# Patient Record
Sex: FEMALE | Race: WHITE | NOT HISPANIC OR LATINO | URBAN - METROPOLITAN AREA
[De-identification: names, ages, dates, MRNs, and addresses within clinical notes are randomized per-mention and may not be internally consistent; named-entity substitution may affect disease eponyms.]

---

## 2023-12-21 ENCOUNTER — APPOINTMENT (OUTPATIENT)
Dept: ORTHOPEDIC SURGERY | Facility: CLINIC | Age: 53
End: 2023-12-21
Payer: COMMERCIAL

## 2024-01-08 ENCOUNTER — ANCILLARY PROCEDURE (OUTPATIENT)
Dept: RADIOLOGY | Facility: CLINIC | Age: 54
End: 2024-01-08
Payer: COMMERCIAL

## 2024-01-08 DIAGNOSIS — M79.671 RIGHT FOOT PAIN: ICD-10-CM

## 2024-01-08 PROCEDURE — 73630 X-RAY EXAM OF FOOT: CPT | Mod: RT

## 2024-01-08 PROCEDURE — 73630 X-RAY EXAM OF FOOT: CPT | Mod: RIGHT SIDE | Performed by: RADIOLOGY

## 2024-01-09 ENCOUNTER — OFFICE VISIT (OUTPATIENT)
Dept: ORTHOPEDIC SURGERY | Facility: CLINIC | Age: 54
End: 2024-01-09
Payer: COMMERCIAL

## 2024-01-09 ENCOUNTER — APPOINTMENT (OUTPATIENT)
Dept: RADIOLOGY | Facility: CLINIC | Age: 54
End: 2024-01-09
Payer: COMMERCIAL

## 2024-01-09 DIAGNOSIS — M79.671 RIGHT FOOT PAIN: ICD-10-CM

## 2024-01-09 DIAGNOSIS — M20.5X1 CLAWTOE, ACQUIRED, RIGHT: Primary | ICD-10-CM

## 2024-01-09 DIAGNOSIS — M20.11 HALLUX VALGUS (ACQUIRED), RIGHT FOOT: ICD-10-CM

## 2024-01-09 PROCEDURE — 99214 OFFICE O/P EST MOD 30 MIN: CPT | Performed by: ORTHOPAEDIC SURGERY

## 2024-01-09 PROCEDURE — 99204 OFFICE O/P NEW MOD 45 MIN: CPT | Performed by: ORTHOPAEDIC SURGERY

## 2024-01-09 NOTE — PROGRESS NOTES
"This 53-year-old woman complains of right foot pain and deformity which she has had \"since birth\".  Patient notes as long as she can remember she has had pain and deformity of her foot.  The patient notes her father had severe bunion deformity.    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  The patient continued to have pain and following the MRI was diagnosed with a Villagomez's neuroma in the third webspace and had it excised.  She still has some burning in the third webspace.  She notes her symptoms are markedly improved.  She tells me pathology confirmed the excision of the Villagomez's neuroma.    The patient's current symptoms are with progressive deformity of the hallux causing pain in the second toe.  Patient also notes pain throughout the second toe associated with a burning feeling in the second toe.  She notes her second toe basically hurts \"all the time\".  Patient also complains of pain in the first metatarsal phalangeal joint associated with crepitus.    Review of systems:  A complete review of the patient's past medical history and review of 30 systems and all medications and allergies was performed. Please see adult medical record for details.    A Family history for genetic or familial inheritance issues and Social history including smoking history, alcohol consumption and exercise activities was also reviewed and significant findings noted in the patients history of present illness.    Physical Exam:  The patient is well-nourished and well-developed and in no acute distress. The patient displayed normal mood and affect. The patient's pupils were equal, round sclerae are white. Patient's respirations appear to be regular and unlabored.    The right foot was examined.  Patient's gait and stance revealed mild pes planus and pronation.  There did " not appear to be any skin abnormalities or lymphangitis.  There was no significant abnormal swelling or lymphangitis or erythema.  There was a hallux valgus deformity with the great toe impinging on the second toe.  Skin over the medial eminence was thickened, erythematous and callused.  There was mild tenderness over the medial eminence.  The range of motion of the first metatarsal phalangeal joint was somewhat restricted to 20° of dorsiflexion 0°, plantar flexion.  There was pain and crepitus with range of motion of the joint at the metatarsal phalangeal joint.    The second toe revealed a fixed claw toe deformity.  There was a tender callosity over the proximal interphalangeal joint.  There was medial deviation of the digit as well as the pain lesser toes.    Range of motion of the ankle, subtalar, midtarsal joints were normal with a positive Silfverskiold test. the neurovascular examination of the foot and ankle was intact.The neurological exam including motor and sensory exam was performed. The vascular examination including palpation of pulses and capillary refill of the foot was performed and determined to be intact.    Imaging:  I personally reviewed and measured the radiographs including AP and lateral views of the extremity and they revealed hallux valgus deformity measuring 48 degrees with degenerative changes seen in the first metatarsal phalangeal joint and the intermetatarsal angle of 12.4 degrees.  Clawing of the second toe.    Impression & Plan:   It is my impression this patient has symptomatic hallux valgus deformity with degenerative arthritis of the first metatarsal phalangeal joint.  She also has a symptomatic second toe claw toe deformity.    I would recommend right foot fusion first metatarsal phalangeal joint and correction second toe claw toe deformity with a PIP joint fusion and release the contracted tissue at the metatarsal phalangeal joint.  Special equipment needed are to 2.8 mm  threaded double pointed Steinmann pins.    The risks of first metatarsophalangeal joint fusion and second toe claw toe deformity correction, options, and procedure were explained to the patient in detail. All of their questions were answered. The patient understands that we cannot make them a new or normal foot and understands the risk of recurrence, the risk of nonunion, the risk of residual joint stiffness, as well as the risks of infection and healing complications.  It is unlikely that the surgery will relieve her burning symptoms in the web spaces of her toes from the Villagomez's neuroma surgery.  The procedure can be performed as an out patient.  The need for second procedure to remove the intramedullary threaded Steinmann pins approximately 12 weeks after the initial fusion was discussed.  The patient should avoid travel for the entire 12 weeks of healing.          Note dictated with [employer] software.  Completed without full type editing and sent to avoid delay.

## 2024-03-04 ENCOUNTER — TELEPHONE (OUTPATIENT)
Dept: ORTHOPEDIC SURGERY | Facility: HOSPITAL | Age: 54
End: 2024-03-04
Payer: COMMERCIAL

## 2024-03-12 ENCOUNTER — OFFICE VISIT (OUTPATIENT)
Dept: ORTHOPEDIC SURGERY | Facility: CLINIC | Age: 54
End: 2024-03-12
Payer: COMMERCIAL

## 2024-03-12 DIAGNOSIS — M20.11 HALLUX VALGUS (ACQUIRED), RIGHT FOOT: Primary | ICD-10-CM

## 2024-03-12 DIAGNOSIS — M20.5X1 CLAWTOE, ACQUIRED, RIGHT: ICD-10-CM

## 2024-03-12 PROCEDURE — 99214 OFFICE O/P EST MOD 30 MIN: CPT | Performed by: ORTHOPAEDIC SURGERY

## 2024-03-12 RX ORDER — CELECOXIB 200 MG/1
200 CAPSULE ORAL DAILY
Qty: 30 CAPSULE | Refills: 0 | Status: SHIPPED | OUTPATIENT
Start: 2024-03-12 | End: 2024-04-11

## 2024-03-12 NOTE — PROGRESS NOTES
This 53-year-old woman who works in [employer] business returns today to discuss her surgery scheduled for June 18 consisting of a fusion of the right first metatarsal phalangeal joint and correction second toe claw toe deformity.    Complicating the patient's situation is treatment she had in [place] for Villagomez's neuroma with a ligament release in the second webspace followed by a second surgery for excision of a Villagomez's neuroma in the third webspace.  The patient is continuing to have intermittent shooting pain in the lateral aspect of her foot.  Sometimes she gets tingling and numbness in her lessor toes.    Review of systems:  A complete review of the patient's past medical history and review of 30 systems and all medications and allergies was performed. Please see adult medical record for details.    A Family history for genetic or familial inheritance issues and Social history including smoking history, alcohol consumption and exercise activities was also reviewed and significant findings noted in the patients history of present illness.    Physical Exam:  The patient is well-nourished and well-developed and in no acute distress. The patient displayed normal mood and affect. The patient's pupils were equal, round sclerae are white. Patient's respirations appear to be regular and unlabored.    The right foot was examined.  There did not appear to be any skin abnormalities or lymphangitis.  There was no significant abnormal swelling or lymphangitis or erythema.  There was a well-healed incision at both the dorsum of the second and third webspaces.  Negative click test in both webspaces.    There was a severe hallux valgus deformity with the great toe impinging on the second toe.  Skin over the medial eminence was thickened and callused.  There was mild tenderness over the medial eminence.  The range of motion of the first metatarsal phalangeal joint was restricted to 20° of dorsiflexion 0°, plantar flexion.   Was discomfort with range of motion of the first metatarsal phalangeal joint.    There was full motion of the IP joint of the great toe.    The second toe revealed a fixed claw toe deformity.    Range of motion of the ankle, subtalar, midtarsal joints were normal with a positive Silfverskiold test.    The neurovascular examination of the foot and ankle was intact.The neurological exam including motor and sensory exam was performed. The vascular examination including palpation of pulses and capillary refill of the foot was performed and determined to be intact.      Imaging:  I personally reviewed and measured the radiographs including AP and lateral views of the extremity and they revealed [hallux valgus deformity measuring 48 degrees with degenerative changes seen in the first metatarsal phalangeal joint and an intermetatarsal angle of 12.4 degrees.  Claw toe deformity of the second toe.    Impression & Plan:   It is my impression this patient has severe hallux valgus and a second toe claw toe deformity which is fixed.  She has degenerative arthritis of the first metatarsal phalangeal joint.  The great toe is markedly impinging on the lesser toes.    I have recommended a right first metatarsal phalangeal joint fusion and a correction of the second claw toe deformity.    Once again I explained to the patient the risks option and procedure of the first metatarsal phalangeal joint fusion and correction of the second claw toe deformity.  Once again I explained to the patient we cannot make her new or normal foot.  Surgery will take 12 weeks to heal and at that time she will need percutaneous pin removal in the operating room under twilight sleep anesthesia.  She will only be able to walk on her heel during the 12 weeks of healing.  She should not travel by air during that period because of the risk of blood clots.    Patient appears to be having chronic neurologic type pain from her Villagomez's neuroma surgeries.   "Explained to the patient that it is unlikely but possible that providing her with a better aligned foot will have some effect on the neurogenic pain she is having from the Villagomez's neuroma surgeries.  If she has continued pain in those areas I would recommend consultation with the pain clinic.    The patient tells me she is can be very busy between now and June doing a lot of walking at meetings.  She is having a lot of pain in her foot with weightbearing activities.  She has tried ibuprofen as well as Naprosyn and they \"tear up her stomach\".  I suggested she might want to try Celebrex because of her stomach problems and difficulty taking the other medications.  The patient would like a prescription for the Celebrex to take while she is at her meetings.    Prescription for Celebrex 200 mg tablets 1 p.o. daily with warnings of possible side effects was provided to the patient.    I would be delighted to see the patient back the future should  they  have further problems.    Note dictated with [employer] software.  Completed without full type editing and sent to avoid delay.        "

## 2024-04-10 ENCOUNTER — HOSPITAL ENCOUNTER (OUTPATIENT)
Facility: HOSPITAL | Age: 54
Setting detail: OUTPATIENT SURGERY
End: 2024-04-10
Attending: ORTHOPAEDIC SURGERY | Admitting: ORTHOPAEDIC SURGERY
Payer: COMMERCIAL

## 2024-04-10 PROBLEM — M20.5X1 OTHER DEFORMITIES OF TOE(S) (ACQUIRED), RIGHT FOOT: Status: ACTIVE | Noted: 2024-04-10

## 2024-05-09 ENCOUNTER — OFFICE VISIT (OUTPATIENT)
Dept: ORTHOPEDIC SURGERY | Facility: CLINIC | Age: 54
End: 2024-05-09
Payer: COMMERCIAL

## 2024-05-09 VITALS — HEIGHT: 63 IN | BODY MASS INDEX: 25.16 KG/M2 | WEIGHT: 142 LBS

## 2024-05-09 DIAGNOSIS — M20.41 HAMMERTOE OF RIGHT FOOT: ICD-10-CM

## 2024-05-09 DIAGNOSIS — M20.11 HALLUX VALGUS (ACQUIRED), RIGHT FOOT: Primary | ICD-10-CM

## 2024-05-09 PROCEDURE — 99204 OFFICE O/P NEW MOD 45 MIN: CPT | Performed by: ORTHOPAEDIC SURGERY

## 2024-05-09 PROCEDURE — 1036F TOBACCO NON-USER: CPT | Performed by: ORTHOPAEDIC SURGERY

## 2024-05-09 RX ORDER — ESTROGENS, CONJUGATED 0.45 MG/1
0.45 TABLET, FILM COATED ORAL DAILY
COMMUNITY

## 2024-05-09 RX ORDER — CLONAZEPAM 0.25 MG/1
TABLET, ORALLY DISINTEGRATING ORAL
COMMUNITY

## 2024-05-09 RX ORDER — ACETAMINOPHEN 325 MG/1
TABLET ORAL
COMMUNITY
Start: 2022-06-06

## 2024-05-09 RX ORDER — LORAZEPAM 0.5 MG/1
0.5 TABLET ORAL DAILY PRN
COMMUNITY

## 2024-05-09 RX ORDER — ZOLPIDEM TARTRATE 5 MG/1
5 TABLET ORAL NIGHTLY PRN
COMMUNITY

## 2024-05-09 RX ORDER — SODIUM CHLORIDE, SODIUM LACTATE, POTASSIUM CHLORIDE, CALCIUM CHLORIDE 600; 310; 30; 20 MG/100ML; MG/100ML; MG/100ML; MG/100ML
100 INJECTION, SOLUTION INTRAVENOUS CONTINUOUS
OUTPATIENT
Start: 2024-05-09

## 2024-05-09 RX ORDER — CHOLECALCIFEROL (VITAMIN D3) 25 MCG
TABLET ORAL
COMMUNITY

## 2024-05-09 RX ORDER — FLUTICASONE PROPIONATE 50 MCG
SPRAY, SUSPENSION (ML) NASAL
COMMUNITY
Start: 2023-11-30

## 2024-05-09 RX ORDER — PROGESTERONE 100 MG/1
100 CAPSULE ORAL DAILY
COMMUNITY

## 2024-05-09 RX ORDER — SERTRALINE HYDROCHLORIDE 50 MG/1
50 TABLET, FILM COATED ORAL DAILY
COMMUNITY

## 2024-05-09 RX ORDER — CALC/MAG/B COMPLEX/D3/HERB 61
TABLET ORAL
COMMUNITY
Start: 2018-09-18

## 2024-05-09 RX ORDER — BUPROPION HYDROCHLORIDE 150 MG/1
150 TABLET ORAL
COMMUNITY

## 2024-05-09 RX ORDER — CEFAZOLIN SODIUM 2 G/100ML
2 INJECTION, SOLUTION INTRAVENOUS ONCE
OUTPATIENT
Start: 2024-05-09 | End: 2024-05-09

## 2024-05-09 NOTE — PROGRESS NOTES
53-year-old female here for right foot pain.  Long history of issues with her right foot.  H neuroma excision in [place] a few years ago.  Initially someone did a ligament release in the second webspace and then ultimately a third webspace neuroma excision.  Her preoperative nerve pain was gone though she does have some pain in the toes.  Her biggest complaint of pain is around her second toe where her big toe rubs in place.  Does not wear high-heeled shoes anymore.  Has had a longstanding bunion deformity.  Has lesser toe deformities of her second third and fourth toe.  No diabetes.  Non-smoker.    On exam:  WD/WN thin female  A+O X3  NAD  No lymphedema  Inspection of both feet and ankles show symmetric arches.   Able to STR bilaterally.   Severe flexible hallux valgus with impinging second toe.  Tender second PIP joint.  Fixed third mallet toe and fourth curly toe DIP joint.  No pain with lesser MTP anterior drawer.  5/5 strength in all 4 planes.   Sensation intact to LT.   Good pulses.   Stable anterior drawer.  No peroneal subluxation.    (-) Jadyn.     I personally reviewed the following radiographic exams: Weightbearing x-rays right foot from [facility] shows degenerative hallux valgus.  Arthritic sesamoids on the sesamoid view.    Assessment: Degenerative hallux valgus right foot.  Impinging right second hammertoe.  Right third mallet toe.  Right fourth curly toe.    Plan: Discussed nonoperative and operative options in detail.   Risk and benefits discussed in detail. All questions answered today.  Recovery timeline and expectations discussed in detail.  The bunion is not that symptomatic, it is causing her most significant pain in her second toe.  We discussed the surgical options for the great toe including first MTP fusion versus Rutherford osteotomy.  This would allow us to fix her second toe.  She is leaning towards a first MTP fusion.  We discussed correction of her second toe, third toe and  fourth toe with PIP or DIP arthroplasty with K wire.  We discussed postop recovery in detail.  Right first MTP fusion [id number], second PIP/third and fourth DIP fusions [id number].  Arthrex first MTP set, K wires.  C-arm,.. General plus regional. 45 minutes  Crutches or Walker/ Consider knee scooter   postop shoe

## 2024-05-17 PROBLEM — M20.41 HAMMERTOE OF RIGHT FOOT: Status: ACTIVE | Noted: 2024-05-09
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
## 2024-06-17 ENCOUNTER — CLINICAL SUPPORT (OUTPATIENT)
Dept: AUDIOLOGY | Facility: CLINIC | Age: 54
End: 2024-06-17
Payer: COMMERCIAL

## 2024-06-17 DIAGNOSIS — R42 DIZZINESS: Primary | ICD-10-CM

## 2024-06-17 PROCEDURE — 92557 COMPREHENSIVE HEARING TEST: CPT | Performed by: AUDIOLOGIST

## 2024-06-17 PROCEDURE — 92550 TYMPANOMETRY & REFLEX THRESH: CPT | Performed by: AUDIOLOGIST

## 2024-06-17 NOTE — PROGRESS NOTES
"  ADULT AUDIOMETRIC EVALUATION    Name:  Gayla Jarvis  :  1970  Age:  54 y.o.  Date of Evaluation:  2024    HISTORY:  Reason for visit: Ms. Jarvis is seen today for an evaluation of hearing.     Patient reports no concerns for hearing loss at this time. She is being seen for an ENG on 2024. Today's appointment serves as her audiologic evaluation in conjunction with the vestibular evaluation. She reports dizziness beginning in April of this year as she woke up with spinning vertigo. Reportedly, she was seen by ENT who tested her for BPPV which was mildly positive. Patient was given instruction to do the Epley maneuver at home, which she reported helped ease the dizziness but did not completely get rid of it. She notices the dizziness most after sitting at her computer. Of note, she reported testing positive for Influenza A three weeks prior to the onset of dizziness. She also believes the dizziness may be neck-related. Patient reported occasional tinnitus which began many years ago. Her tinnitus and hearing has not worsened since the dizziness began in April.     Denies: otorrhea, noise exposure, and aural fullness    EVALUATION:    See Audiogram and Immittance results under \"Media\".    RESULTS:     Otoscopic Evaluation:     RIGHT  Clear canal with tympanic membrane visualized    LEFT  Clear canal with tympanic membrane visualized    Immittance:   Immittance Measures: 226 Hz          Right Ear: Type A: Normal middle ear function         Left Ear:  Type A: Normal middle ear function    Reflexes and Reflex Decay:    Ipsilateral Reflexes (500-4000 Hz):          Probe/Stimulus Right Ear: present       Probe/Stimulus Left Ear: present           Audiometry:  Test Technique: Pure Tone Audiometry under TDH headphones.    Reliability: Excellent     Pure Tone Audiometry:    Right: Normal hearing sensitivity from 125-8000 Hz   Left: Normal hearing sensitivity from 125-8000 Hz    Speech Audiometry (via " recorded, 25-words unless noted; M=masked):       Right Ear: Speech Reception Threshold (SRT) was obtained at 10 dBHL  Word Recognition Scores were Excellent (100%) in quiet when words were presented at 50 dBHL (20M), using the NU-6 2A word list.  Left Ear: Speech Reception Threshold (SRT) was obtained at 10 dBHL  Word Recognition Scores were Excellent (100%) in quiet when words were presented at 50 dBHL (20M), using the NU-6 2A word list.      IMPRESSIONS:  Today's test results suggest normal middle ear function and normal hearing sensitivity from 125-8000 Hz bilaterally.       PATIENT EDUCATION:   Gayla Jarvis was counseled with regard to the findings.       PLAN:  Follow up with ENT and audiology as directed/scheduled.  Retest hearing in conjunction with medical management and sooner if any concerns arise.    Brandon [facility]  Audiology Graduate Clinician    Brandon Caceres, CCC-A  Clinical Audiologist    Time: 13:00-13:30    This note was created using speech recognition transcription software. Despite proofreading, several typographical errors might be present that might affect the meaning of the content. Please call with any questions.     Degree of   Hearing Sensitivity dB Range   Within Normal Limits (WNL) 0 - 20   Slight 25   Mild 26 - 40   Moderate 41 - 55   Moderately-Severe 56 - 70   Severe 71 - 90   Profound 91 +     KEY  TM Tympanic Membrane   WNL Within Normal Limits   HA Hearing Aid   SNHL Sensorineural Hearing Loss   CHL Conductive Hearing Loss   NIHL Noise-Induced Hearing Loss   ECV Ear Canal Volume

## 2024-06-19 ENCOUNTER — CLINICAL SUPPORT (OUTPATIENT)
Dept: AUDIOLOGY | Facility: CLINIC | Age: 54
End: 2024-06-19
Payer: COMMERCIAL

## 2024-06-19 DIAGNOSIS — R42 DIZZINESS AND GIDDINESS: Primary | ICD-10-CM

## 2024-06-19 PROCEDURE — 92519 VEMP TST I&R CERVICAL&OCULAR: CPT

## 2024-06-19 PROCEDURE — 92540 BASIC VESTIBULAR EVALUATION: CPT

## 2024-06-19 PROCEDURE — 92538 CALORIC VSTBLR TEST W/REC: CPT

## 2024-06-19 PROCEDURE — 92700 UNLISTED ORL SERVICE/PX: CPT

## 2024-06-19 NOTE — PATIENT INSTRUCTIONS
BALANCE FUNCTION TEST (BFT)  AFTER VISIT SUMMARY      TESTING SUMMARY     The purpose of today's testing was to evaluate for any vestibular system (inner ear) involvement to account for your symptoms of dizziness/imbalance. Deep inside each of your ears, there are 5 balance organs which contribute to your ability to maintain balance and reduce dizziness. Our vestibular system involves 3 semicircular canals (“spinning detectors”) and 2 otolith organs (“gravity sensors”).    IMPRESSIONS     Based on today's evaluation, your vestibular system appears to be normal and functioning as expected. Overall, there is a low likelihood of the inner ear as a source for your symptoms.     RECOMMENDATIONS     Continue medical follow up with Dr. Gross.   Consider further evaluation into non-vestibular conditions to account for symptoms.  Consider re-evaluation as medically indicated.  Maintain a healthy lifestyle to help body function overall.    Testing and interpretation of results completed by Brandon Pablo CCC-A CCARELI. It was my pleasure to evaluate this patient.       Brandon PabloA CCARELI  Senior Clinical Vestibular Audiologist

## 2024-06-24 ENCOUNTER — APPOINTMENT (OUTPATIENT)
Dept: ORTHOPEDIC SURGERY | Facility: CLINIC | Age: 54
End: 2024-06-24
Payer: COMMERCIAL

## 2024-07-03 DIAGNOSIS — M20.41 HAMMERTOE OF RIGHT FOOT: ICD-10-CM

## 2024-07-03 DIAGNOSIS — M20.11 HALLUX VALGUS (ACQUIRED), RIGHT FOOT: ICD-10-CM

## 2024-07-03 DIAGNOSIS — M20.5X1 CLAWTOE, ACQUIRED, RIGHT: ICD-10-CM

## 2024-07-08 ENCOUNTER — TELEPHONE (OUTPATIENT)
Dept: PREADMISSION TESTING | Facility: HOSPITAL | Age: 54
End: 2024-07-08
Payer: COMMERCIAL

## 2024-07-10 ENCOUNTER — LAB (OUTPATIENT)
Dept: LAB | Facility: LAB | Age: 54
End: 2024-07-10
Payer: COMMERCIAL

## 2024-07-10 ENCOUNTER — PRE-ADMISSION TESTING (OUTPATIENT)
Dept: PREADMISSION TESTING | Facility: HOSPITAL | Age: 54
End: 2024-07-10
Payer: COMMERCIAL

## 2024-07-10 ENCOUNTER — DOCUMENTATION (OUTPATIENT)
Dept: PREADMISSION TESTING | Facility: HOSPITAL | Age: 54
End: 2024-07-10

## 2024-07-10 VITALS
DIASTOLIC BLOOD PRESSURE: 48 MMHG | WEIGHT: 152.12 LBS | BODY MASS INDEX: 26.95 KG/M2 | RESPIRATION RATE: 18 BRPM | OXYGEN SATURATION: 99 % | SYSTOLIC BLOOD PRESSURE: 110 MMHG | HEIGHT: 63 IN | TEMPERATURE: 98.1 F | HEART RATE: 72 BPM

## 2024-07-10 DIAGNOSIS — Z01.818 PREOP EXAMINATION: Primary | ICD-10-CM

## 2024-07-10 DIAGNOSIS — Z01.818 PREOP EXAMINATION: ICD-10-CM

## 2024-07-10 LAB
BASOPHILS # BLD AUTO: 0.09 X10*3/UL (ref 0–0.1)
BASOPHILS NFR BLD AUTO: 1.9 %
EOSINOPHIL # BLD AUTO: 0.31 X10*3/UL (ref 0–0.7)
EOSINOPHIL NFR BLD AUTO: 6.7 %
ERYTHROCYTE [DISTWIDTH] IN BLOOD BY AUTOMATED COUNT: 11.8 % (ref 11.5–14.5)
HCT VFR BLD AUTO: 44.5 % (ref 36–46)
HGB BLD-MCNC: 14.8 G/DL (ref 12–16)
IMM GRANULOCYTES # BLD AUTO: 0.02 X10*3/UL (ref 0–0.7)
IMM GRANULOCYTES NFR BLD AUTO: 0.4 % (ref 0–0.9)
LYMPHOCYTES # BLD AUTO: 1.4 X10*3/UL (ref 1.2–4.8)
LYMPHOCYTES NFR BLD AUTO: 30.2 %
MCH RBC QN AUTO: 31 PG (ref 26–34)
MCHC RBC AUTO-ENTMCNC: 33.3 G/DL (ref 32–36)
MCV RBC AUTO: 93 FL (ref 80–100)
MONOCYTES # BLD AUTO: 0.33 X10*3/UL (ref 0.1–1)
MONOCYTES NFR BLD AUTO: 7.1 %
NEUTROPHILS # BLD AUTO: 2.49 X10*3/UL (ref 1.2–7.7)
NEUTROPHILS NFR BLD AUTO: 53.7 %
NRBC BLD-RTO: 0 /100 WBCS (ref 0–0)
PLATELET # BLD AUTO: 209 X10*3/UL (ref 150–450)
RBC # BLD AUTO: 4.78 X10*6/UL (ref 4–5.2)
WBC # BLD AUTO: 4.6 X10*3/UL (ref 4.4–11.3)

## 2024-07-10 PROCEDURE — 85025 COMPLETE CBC W/AUTO DIFF WBC: CPT

## 2024-07-10 PROCEDURE — 99204 OFFICE O/P NEW MOD 45 MIN: CPT | Performed by: NURSE PRACTITIONER

## 2024-07-10 PROCEDURE — 87081 CULTURE SCREEN ONLY: CPT | Mod: AHULAB | Performed by: NURSE PRACTITIONER

## 2024-07-10 PROCEDURE — 36415 COLL VENOUS BLD VENIPUNCTURE: CPT

## 2024-07-10 RX ORDER — CHLORHEXIDINE GLUCONATE ORAL RINSE 1.2 MG/ML
15 SOLUTION DENTAL 2 TIMES DAILY
Qty: 473 ML | Refills: 0 | Status: SHIPPED | OUTPATIENT
Start: 2024-07-10

## 2024-07-10 ASSESSMENT — ENCOUNTER SYMPTOMS
ARTHRALGIAS: 1
ENDOCRINE NEGATIVE: 1
NEUROLOGICAL NEGATIVE: 1
RESPIRATORY NEGATIVE: 1
CONSTITUTIONAL NEGATIVE: 1
GASTROINTESTINAL NEGATIVE: 1

## 2024-07-10 NOTE — PREPROCEDURE INSTRUCTIONS
Medication List            Accurate as of July 10, 2024  7:59 AM. Always use your most recent med list.                acetaminophen 325 mg tablet  Commonly known as: Tylenol  Medication Adjustments for Surgery: Take morning of surgery with sip of water, no other fluids     buPROPion  mg 24 hr tablet  Commonly known as: Wellbutrin XL  Medication Adjustments for Surgery: Take morning of surgery with sip of water, no other fluids     chlorhexidine 0.12 % solution  Commonly known as: Peridex  Use 15 mL in the mouth or throat 2 times a day. Use night before surgery and morning of surgery Swish and spit  Medication Adjustments for Surgery: Take morning of surgery with sip of water, no other fluids     cholecalciferol 25 MCG (1000 UT) tablet  Commonly known as: Vitamin D-3  Medication Adjustments for Surgery: Continue until night before surgery     clonazePAM 0.25 mg disintegrating tablet  Commonly known as: KlonoPIN  Medication Adjustments for Surgery: Take morning of surgery with sip of water, no other fluids     fluticasone 50 mcg/actuation nasal spray  Commonly known as: Flonase  Medication Adjustments for Surgery: Take morning of surgery with sip of water, no other fluids     LORazepam 0.5 mg tablet  Commonly known as: Ativan  Medication Adjustments for Surgery: Take morning of surgery with sip of water, no other fluids     Premarin 0.45 mg tablet  Generic drug: estrogens (conjugated)  Medication Adjustments for Surgery: Continue until night before surgery     Prevacid 15 mg DR capsule  Generic drug: lansoprazole  Medication Adjustments for Surgery: Take morning of surgery with sip of water, no other fluids     progesterone 100 mg capsule  Commonly known as: Prometrium  Medication Adjustments for Surgery: Take morning of surgery with sip of water, no other fluids     sertraline 50 mg tablet  Commonly known as: Zoloft  Medication Adjustments for Surgery: Take morning of surgery with sip of water, no other  fluids     zolpidem 5 mg tablet  Commonly known as: Ambien  Medication Adjustments for Surgery: Continue until night before surgery     ZyrTEC 10 mg capsule  Generic drug: cetirizine  Medication Adjustments for Surgery: Continue until night before surgery                          **Concerning above medication instructions, if medication is normally taken at night, continue normal schedule.**  **DO NOT TAKE NIGHT PRIOR AND MORNING OF SURGERY**    CONTACT SURGEON'S OFFICE IF YOU DEVELOP:  * Fever = 100.4 F   * New respiratory symptoms (e.g. cough, shortness of breath, respiratory distress, sore throat)  * Recent loss of taste or smell  *Flu like symptoms such as headache, fatigue or gastrointestinal symptoms  * You develop any open sores, shingles, burning or painful urination   AND/OR:  * You no longer wish to have the surgery.  * Any other personal circumstances change that may lead to the need to cancel or defer this surgery.  *You were admitted to any hospital within one week of your planned procedure.    SMOKING:  *Quitting smoking can make a huge difference to your health and recovery from surgery.    *If you need help with quitting, call 1-622-QUIT-NOW.    THE DAY BEFORE SURGERY:  *Do not eat any food after midnight the night before surgery.   *You are permitted to drink clear liquids (i.e. water, black coffee (no milk or cream), tea, apple juice and electrolyte drinks (gatorade)) up to 13.5 ounces, up to 2 hours before your arrival time.  *You may chew gum until 2 hours before your surgery    SURGICAL TIME  *You will be contacted between 2 p.m. and 6 p.m. the business day before your surgery with your arrival time.  *If you haven't received a call by 6pm, call [phone].  *Scheduled surgery times may change and you will be notified if this occurs-check your personal voicemail for any updates.    ON THE MORNING OF SURGERY:  *Wear comfortable, loose fitting clothing.   *Do not use moisturizers, creams,  lotions or perfume.  *All jewelry and valuables should be left at home.  *Prosthetic devices such as contact lenses, hearing aids, dentures, eyelash extensions, hairpins and body piercing must be removed before surgery.    BRING WITH YOU:  *Photo ID and insurance card  *Current list of medicines and allergies  *Pacemaker/Defibrillator/Heart stent cards  *CPAP machine and mask  *Slings/splints/crutches  *Copy of your complete Advanced Directive/DHPOA-if applicable  *Neurostimulator implant remote    PARKING AND ARRIVAL:  *Check in at the Main Entrance desk and let them know you are here for surgery.  *You will be directed to the 2nd floor surgical waiting area.    AFTER OUTPATIENT SURGERY:  *A responsible adult MUST accompany you at the time of discharge and stay with you for 24 hours after your surgery.  *You may NOT drive yourself home after surgery.  *You may use a taxi or ride sharing service ([employer], [employer]) to return home ONLY if you are accompanied by a friend or family member.  *Instructions for resuming your medications will be provided by your surgeon.

## 2024-07-10 NOTE — CPM/PAT NURSE NOTE
CPM/PAT Nurse Note      Name: Gayla Jarvis (Gayla Jarvis)  /Age: 1970/54 y.o.       Past Medical History:   Diagnosis Date    Allergic rhinitis     Anxiety     Bunion     Depression     Diverticulosis     GERD (gastroesophageal reflux disease)     Hammer toe 2000    Neuroma of foot 1/15/22       Past Surgical History:   Procedure Laterality Date    COLONOSCOPY      DILATION AND CURETTAGE OF UTERUS      FOOT NEUROMA SURGERY Right     x2    WISDOM TOOTH EXTRACTION         Patient  reports that she is not currently sexually active.    Family History   Problem Relation Name Age of Onset    Cancer Mother Pooja Jarvis     Hyperlipidemia Mother Pooja Jarvis     Hypertension Mother Pooja Jarvis     Transient ischemic attack Mother Pooja Jarvis     Heart disease Mother Pooja Jarvis     Heart disease Father      Cirrhosis Father      Hypertension Father      No Known Problems Sister         Allergies   Allergen Reactions    Ofloxacin Itching, Rash, Swelling and Other     felt like throat closing    Sulfamethoxazole-Trimethoprim Other, Itching, Rash, Swelling and Unknown     felt like throat closing    Metronidazole Unknown       Prior to Admission medications    Medication Sig Start Date End Date Taking? Authorizing Provider   acetaminophen (Tylenol) 325 mg tablet TAKE 2 TABLETS BY MOUTH EVERY 6 HOURS AS NEEDED FOR MILD PAIN. 22   Historical Provider, MD   buPROPion XL (Wellbutrin XL) 150 mg 24 hr tablet Take 1 tablet (150 mg) by mouth once daily in the morning. Take before meals.    Historical Provider, MD   cetirizine (ZyrTEC) 10 mg capsule     Historical Provider, MD   chlorhexidine (Peridex) 0.12 % solution Use 15 mL in the mouth or throat 2 times a day. Use night before surgery and morning of surgery Swish and spit 7/10/24   Joyce Gilbert, APRN-CNP   cholecalciferol (Vitamin D-3) 25 MCG (1000 UT) tablet     Historical Provider, MD   clonazePAM (KlonoPIN) 0.25 mg disintegrating tablet PLACE 1 TABLET  (0.25 MG) ON THE TONGUE AND ALLOW TO DISSOLVE 2 TIMES PER DAY    Historical Provider, MD   fluticasone (Flonase) 50 mcg/actuation nasal spray 2 sprays, Nasal, DAILY WITH BREAKFAST, # 16 g, Refill(s) 11, Date: 11/30/23 9:36:00 AM EST, Pharmacy: [facility]/pharmacy #[id number], 2 sprays Nasal DAILY WITH BREAKFAST, Facial pain 11/30/23   Historical Provider, MD   lansoprazole (Prevacid) 15 mg DR capsule Take by mouth. 9/18/18   Historical Provider, MD   LORazepam (Ativan) 0.5 mg tablet Take 1 tablet (0.5 mg) by mouth once daily as needed.    Historical Provider, MD   Premarin 0.45 mg tablet Take 1 tablet (0.45 mg) by mouth once daily.    Historical Provider, MD   progesterone (Prometrium) 100 mg capsule Take 1 capsule (100 mg) by mouth once daily.    Historical Provider, MD   sertraline (Zoloft) 50 mg tablet Take 1 tablet (50 mg) by mouth once daily.    Historical Provider, MD   zolpidem (Ambien) 5 mg tablet Take 1 tablet (5 mg) by mouth as needed at bedtime.    Historical Provider, MD        PAT ROS     DASI Risk Score    No data to display       Caprini DVT Assessment    No data to display       Modified Frailty Index    No data to display       CHADS2 Stroke Risk  Current as of 24 minutes ago        N/A 3 to 100%: High Risk   2 to < 3%: Medium Risk   0 to < 2%: Low Risk     Last Change: N/A          This score determines the patient's risk of having a stroke if the patient has atrial fibrillation.        This score is not applicable to this patient. Components are not calculated.          Revised Cardiac Risk Index    No data to display       Apfel Simplified Score    No data to display       Risk Analysis Index Results This Encounter    No data found in the last 1 encounters.         Nurse Plan of Action: After Visit Summary (AVS) reviewed and patient verbalized good understanding of medications and NPO instructions.

## 2024-07-10 NOTE — H&P (VIEW-ONLY)
[facility] Evaluation       Name: Gayla Jarvis (Gayla Jarvis)  /Age: 1970/54 y.o.     In-Person           HPI        Date of Consult: 7/10/24    Referring Provider: Dr. Barrow     Surgery, Date, and Length: Right Foot Arthrodesis,     Gayla Jarvis is a 54 year-old female who presents to the [facility] for perioperative risk assessment prior to surgery.    Patient presents with a primary diagnosis of right foot pain. Long history of issues with her right foot. H neuroma excision in [place] a few years ago. Initially someone did a ligament release in the second webspace and then ultimately a third webspace neuroma excision. Her preoperative nerve pain was gone though she does have some pain in the toes. Her biggest complaint of pain is around her second toe where her big toe rubs in place. Does not wear high-heeled shoes anymore. Has had a longstanding bunion deformity. Has lesser toe deformities of her second third and fourth toe. No diabetes. Non-smoker.     This note was created in part upon personal review of patient's medical records.      Patient is scheduled to have Right Foot Arthrodesis      Pt denies any past history of anesthetic complications such as PONV, awareness, prolonged sedation, dental damage, aspiration, cardiac arrest, difficult intubation, difficult I.V. access or unexpected hospital admissions.  NO malignant hyperthermia and or pseudocholinesterase deficiency.  No history of blood transfusions     STOP BANG 1    The patient is not a [employer] and will accept blood and blood products if medically indicated.   Type and screen not sent.     Past Medical History:   Diagnosis Date    Allergic rhinitis     Anxiety     Bunion     Depression     Diverticulosis     GERD (gastroesophageal reflux disease)     Hammer toe 2000    Neuroma of foot 1/15/22       Past Surgical History:   Procedure Laterality Date    COLONOSCOPY      DILATION AND CURETTAGE OF UTERUS      FOOT NEUROMA  SURGERY Right     x2    WISDOM TOOTH EXTRACTION          Social History     Tobacco Use    Smoking status: Never    Smokeless tobacco: Never   Substance Use Topics    Alcohol use: Never    Drug use: Never      Family History   Problem Relation Name Age of Onset    Cancer Mother Pooja Jarvis     Hyperlipidemia Mother Pooja Jarvis     Hypertension Mother Pooja Jarvis     Transient ischemic attack Mother Pooja Jarvis     Heart disease Mother Pooja Jarvis     Heart disease Father      Cirrhosis Father      Hypertension Father      No Known Problems Sister             Allergies   Allergen Reactions    Ofloxacin Itching, Rash, Swelling and Other     felt like throat closing    Sulfamethoxazole-Trimethoprim Other, Itching, Rash, Swelling and Unknown     felt like throat closing    Metronidazole Unknown       Current Outpatient Medications:     acetaminophen (Tylenol) 325 mg tablet, TAKE 2 TABLETS BY MOUTH EVERY 6 HOURS AS NEEDED FOR MILD PAIN., Disp: , Rfl:     buPROPion XL (Wellbutrin XL) 150 mg 24 hr tablet, Take 1 tablet (150 mg) by mouth once daily in the morning. Take before meals., Disp: , Rfl:     cetirizine (ZyrTEC) 10 mg capsule, , Disp: , Rfl:     cholecalciferol (Vitamin D-3) 25 MCG (1000 UT) tablet, , Disp: , Rfl:     clonazePAM (KlonoPIN) 0.25 mg disintegrating tablet, PLACE 1 TABLET (0.25 MG) ON THE TONGUE AND ALLOW TO DISSOLVE 2 TIMES PER DAY, Disp: , Rfl:     fluticasone (Flonase) 50 mcg/actuation nasal spray, 2 sprays, Nasal, DAILY WITH BREAKFAST, # 16 g, Refill(s) 11, Date: 11/30/23 9:36:00 AM EST, Pharmacy: [facility]/pharmacy #[id number], 2 sprays Nasal DAILY WITH BREAKFAST, Facial pain, Disp: , Rfl:     lansoprazole (Prevacid) 15 mg DR capsule, Take by mouth., Disp: , Rfl:     LORazepam (Ativan) 0.5 mg tablet, Take 1 tablet (0.5 mg) by mouth once daily as needed., Disp: , Rfl:     Premarin 0.45 mg tablet, Take 1 tablet (0.45 mg) by mouth once daily., Disp: , Rfl:     progesterone (Prometrium) 100 mg capsule, Take 1 capsule  "(100 mg) by mouth once daily., Disp: , Rfl:     sertraline (Zoloft) 50 mg tablet, Take 1 tablet (50 mg) by mouth once daily., Disp: , Rfl:     zolpidem (Ambien) 5 mg tablet, Take 1 tablet (5 mg) by mouth as needed at bedtime., Disp: , Rfl:     chlorhexidine (Peridex) 0.12 % solution, Use 15 mL in the mouth or throat 2 times a day. Use night before surgery and morning of surgery Swish and spit, Disp: 473 mL, Rfl: 0      Heart Rate:  [72]   Temp:  [36.7 °C (98.1 °F)]   Resp:  [18]   BP: (110)/(48)   Height:  [159.2 cm (5' 2.68\")]   Weight:  [69 kg (152 lb 1.9 oz)]   SpO2:  [99 %]      PAT ROS:   Constitutional:    anxious  neg    Neuro/Psych:   neg    Eyes:    Wears glasses  Ears:   neg    Nose:   neg    Mouth:   neg    Throat:   neg    Neck:   Cardio:    Functional 4 mets. Denies sob walking up 2 flights of stairs  Respiratory:   neg    Endocrine:   neg    GI:   neg    :   neg    Musculoskeletal:    arthralgias (right foot)  Hematologic:   neg    Skin:  neg        Physical Exam  Vitals reviewed. Physical exam within normal limits.          PAT AIRWAY:   Airway:     Mallampati::  II    Neck ROM::  Full  normal        Lab Results   Component Value Date    WBC 4.6 07/10/2024    HGB 14.8 07/10/2024    HCT 44.5 07/10/2024    MCV 93 07/10/2024     07/10/2024          Assessment and Plan:         Patient is a 54-year-old female scheduled for a with Dr. Barrow on 7/12/24 .  Patient has no active cardiac symptoms.   Patient denies any chest pain, tightness, heaviness, pressure, radiating pain, palpitations, irregular heartbeats, lightheadedness, cough, congestion, shortness of breath, TA, PND, near syncope, weight loss or gain.     RCRI  0  , 3.9 % Risk of MACE        Hematology:  Patient instructed to ambulate as soon as possible postoperatively to decrease thromboembolic risk.   Initiate mechanical DVT prophylaxis as soon as possible and initiate chemical prophylaxis when deemed safe from a bleeding standpoint " post surgery.   -- CBC ordered    Caprini: 3    Risk assessment complete.  Patient is scheduled for a low surgical risk procedure.        Preoperative medication instructions were provided and reviewed with the patient.  Any additional testing or evaluation was explained to the patient.  Nothing by mouth instructions were discussed and patient's questions were answered prior to conclusion to this encounter.  Patient verbalized understanding of preoperative instructions given in preadmission testing; discharge instructions available in EMR.    This note was dictated by a speech recognition.  Minor errors may have been detected in a speech recognition.

## 2024-07-10 NOTE — CPM/PAT H&P
[facility] Evaluation       Name: Gayla Jarvis (Gayla Jarvis)  /Age: 1970/54 y.o.     In-Person           HPI        Date of Consult: 7/10/24    Referring Provider: Dr. Barrow     Surgery, Date, and Length: Right Foot Arthrodesis,     Gayla Jarvis is a 54 year-old female who presents to the [facility] for perioperative risk assessment prior to surgery.    Patient presents with a primary diagnosis of right foot pain. Long history of issues with her right foot. H neuroma excision in [place] a few years ago. Initially someone did a ligament release in the second webspace and then ultimately a third webspace neuroma excision. Her preoperative nerve pain was gone though she does have some pain in the toes. Her biggest complaint of pain is around her second toe where her big toe rubs in place. Does not wear high-heeled shoes anymore. Has had a longstanding bunion deformity. Has lesser toe deformities of her second third and fourth toe. No diabetes. Non-smoker.     This note was created in part upon personal review of patient's medical records.      Patient is scheduled to have Right Foot Arthrodesis      Pt denies any past history of anesthetic complications such as PONV, awareness, prolonged sedation, dental damage, aspiration, cardiac arrest, difficult intubation, difficult I.V. access or unexpected hospital admissions.  NO malignant hyperthermia and or pseudocholinesterase deficiency.  No history of blood transfusions     STOP BANG 1    The patient is not a Faith and will accept blood and blood products if medically indicated.   Type and screen not sent.     Past Medical History:   Diagnosis Date    Allergic rhinitis     Anxiety     Bunion     Depression     Diverticulosis     GERD (gastroesophageal reflux disease)     Hammer toe 2000    Neuroma of foot 1/15/22       Past Surgical History:   Procedure Laterality Date    COLONOSCOPY      DILATION AND CURETTAGE OF UTERUS      FOOT NEUROMA  SURGERY Right     x2    WISDOM TOOTH EXTRACTION          Social History     Tobacco Use    Smoking status: Never    Smokeless tobacco: Never   Substance Use Topics    Alcohol use: Never    Drug use: Never      Family History   Problem Relation Name Age of Onset    Cancer Mother Pooja Jarvis     Hyperlipidemia Mother Pooja Jarvis     Hypertension Mother Pooja Jarvis     Transient ischemic attack Mother Pooja Jarvis     Heart disease Mother Pooja Jarvis     Heart disease Father      Cirrhosis Father      Hypertension Father      No Known Problems Sister             Allergies   Allergen Reactions    Ofloxacin Itching, Rash, Swelling and Other     felt like throat closing    Sulfamethoxazole-Trimethoprim Other, Itching, Rash, Swelling and Unknown     felt like throat closing    Metronidazole Unknown       Current Outpatient Medications:     acetaminophen (Tylenol) 325 mg tablet, TAKE 2 TABLETS BY MOUTH EVERY 6 HOURS AS NEEDED FOR MILD PAIN., Disp: , Rfl:     buPROPion XL (Wellbutrin XL) 150 mg 24 hr tablet, Take 1 tablet (150 mg) by mouth once daily in the morning. Take before meals., Disp: , Rfl:     cetirizine (ZyrTEC) 10 mg capsule, , Disp: , Rfl:     cholecalciferol (Vitamin D-3) 25 MCG (1000 UT) tablet, , Disp: , Rfl:     clonazePAM (KlonoPIN) 0.25 mg disintegrating tablet, PLACE 1 TABLET (0.25 MG) ON THE TONGUE AND ALLOW TO DISSOLVE 2 TIMES PER DAY, Disp: , Rfl:     fluticasone (Flonase) 50 mcg/actuation nasal spray, 2 sprays, Nasal, DAILY WITH BREAKFAST, # 16 g, Refill(s) 11, Date: 11/30/23 9:36:00 AM EST, Pharmacy: [facility]/pharmacy #[id number], 2 sprays Nasal DAILY WITH BREAKFAST, Facial pain, Disp: , Rfl:     lansoprazole (Prevacid) 15 mg DR capsule, Take by mouth., Disp: , Rfl:     LORazepam (Ativan) 0.5 mg tablet, Take 1 tablet (0.5 mg) by mouth once daily as needed., Disp: , Rfl:     Premarin 0.45 mg tablet, Take 1 tablet (0.45 mg) by mouth once daily., Disp: , Rfl:     progesterone (Prometrium) 100 mg capsule, Take 1 capsule  "(100 mg) by mouth once daily., Disp: , Rfl:     sertraline (Zoloft) 50 mg tablet, Take 1 tablet (50 mg) by mouth once daily., Disp: , Rfl:     zolpidem (Ambien) 5 mg tablet, Take 1 tablet (5 mg) by mouth as needed at bedtime., Disp: , Rfl:     chlorhexidine (Peridex) 0.12 % solution, Use 15 mL in the mouth or throat 2 times a day. Use night before surgery and morning of surgery Swish and spit, Disp: 473 mL, Rfl: 0      Heart Rate:  [72]   Temp:  [36.7 °C (98.1 °F)]   Resp:  [18]   BP: (110)/(48)   Height:  [159.2 cm (5' 2.68\")]   Weight:  [69 kg (152 lb 1.9 oz)]   SpO2:  [99 %]      PAT ROS:   Constitutional:    anxious  neg    Neuro/Psych:   neg    Eyes:    Wears glasses  Ears:   neg    Nose:   neg    Mouth:   neg    Throat:   neg    Neck:   Cardio:    Functional 4 mets. Denies sob walking up 2 flights of stairs  Respiratory:   neg    Endocrine:   neg    GI:   neg    :   neg    Musculoskeletal:    arthralgias (right foot)  Hematologic:   neg    Skin:  neg        Physical Exam  Vitals reviewed. Physical exam within normal limits.          PAT AIRWAY:   Airway:     Mallampati::  II    Neck ROM::  Full  normal        Lab Results   Component Value Date    WBC 4.6 07/10/2024    HGB 14.8 07/10/2024    HCT 44.5 07/10/2024    MCV 93 07/10/2024     07/10/2024          Assessment and Plan:         Patient is a 54-year-old female scheduled for a with Dr. Barrow on 7/12/24 .  Patient has no active cardiac symptoms.   Patient denies any chest pain, tightness, heaviness, pressure, radiating pain, palpitations, irregular heartbeats, lightheadedness, cough, congestion, shortness of breath, TA, PND, near syncope, weight loss or gain.     RCRI  0  , 3.9 % Risk of MACE        Hematology:  Patient instructed to ambulate as soon as possible postoperatively to decrease thromboembolic risk.   Initiate mechanical DVT prophylaxis as soon as possible and initiate chemical prophylaxis when deemed safe from a bleeding standpoint " post surgery.   -- CBC ordered    Caprini: 3    Risk assessment complete.  Patient is scheduled for a low surgical risk procedure.        Preoperative medication instructions were provided and reviewed with the patient.  Any additional testing or evaluation was explained to the patient.  Nothing by mouth instructions were discussed and patient's questions were answered prior to conclusion to this encounter.  Patient verbalized understanding of preoperative instructions given in preadmission testing; discharge instructions available in EMR.    This note was dictated by a speech recognition.  Minor errors may have been detected in a speech recognition.

## 2024-07-10 NOTE — CPM/PAT NURSE NOTE
CPM/PAT Nurse Note      Name: Gayla Jarvis (Gayla Jarvis)  /Age: 1970/54 y.o.       Past Medical History:   Diagnosis Date    Allergic rhinitis     Anxiety     Bunion     Depression     Diverticulosis     GERD (gastroesophageal reflux disease)     Hammer toe 2000    Neuroma of foot 1/15/22       Past Surgical History:   Procedure Laterality Date    COLONOSCOPY      DILATION AND CURETTAGE OF UTERUS      FOOT NEUROMA SURGERY Right     x2    WISDOM TOOTH EXTRACTION         Patient  reports that she is not currently sexually active.    Family History   Problem Relation Name Age of Onset    Cancer Mother Pooja Jarvis     Hyperlipidemia Mother Pooja Jarvis     Hypertension Mother Pooja Jarvis     Transient ischemic attack Mother Pooja Jarvis     Heart disease Mother Pooja Jarvis     Heart disease Father      Cirrhosis Father      Hypertension Father      No Known Problems Sister         Allergies   Allergen Reactions    Ofloxacin Itching, Rash, Swelling and Other     felt like throat closing    Sulfamethoxazole-Trimethoprim Other, Itching, Rash, Swelling and Unknown     felt like throat closing    Metronidazole Unknown       Prior to Admission medications    Medication Sig Start Date End Date Taking? Authorizing Provider   acetaminophen (Tylenol) 325 mg tablet TAKE 2 TABLETS BY MOUTH EVERY 6 HOURS AS NEEDED FOR MILD PAIN. 22   Historical Provider, MD   buPROPion XL (Wellbutrin XL) 150 mg 24 hr tablet Take 1 tablet (150 mg) by mouth once daily in the morning. Take before meals.    Historical Provider, MD   cetirizine (ZyrTEC) 10 mg capsule     Historical Provider, MD   chlorhexidine (Peridex) 0.12 % solution Use 15 mL in the mouth or throat 2 times a day. Use night before surgery and morning of surgery Swish and spit 7/10/24   Joyce Gilbert, APRN-CNP   cholecalciferol (Vitamin D-3) 25 MCG (1000 UT) tablet     Historical Provider, MD   clonazePAM (KlonoPIN) 0.25 mg disintegrating tablet PLACE 1 TABLET  (0.25 MG) ON THE TONGUE AND ALLOW TO DISSOLVE 2 TIMES PER DAY    Historical Provider, MD   fluticasone (Flonase) 50 mcg/actuation nasal spray 2 sprays, Nasal, DAILY WITH BREAKFAST, # 16 g, Refill(s) 11, Date: 11/30/23 9:36:00 AM EST, Pharmacy: [facility]/pharmacy #[id number], 2 sprays Nasal DAILY WITH BREAKFAST, Facial pain 11/30/23   Historical Provider, MD   lansoprazole (Prevacid) 15 mg DR capsule Take by mouth. 9/18/18   Historical Provider, MD   LORazepam (Ativan) 0.5 mg tablet Take 1 tablet (0.5 mg) by mouth once daily as needed.    Historical Provider, MD   Premarin 0.45 mg tablet Take 1 tablet (0.45 mg) by mouth once daily.    Historical Provider, MD   progesterone (Prometrium) 100 mg capsule Take 1 capsule (100 mg) by mouth once daily.    Historical Provider, MD   sertraline (Zoloft) 50 mg tablet Take 1 tablet (50 mg) by mouth once daily.    Historical Provider, MD   zolpidem (Ambien) 5 mg tablet Take 1 tablet (5 mg) by mouth as needed at bedtime.    Historical Provider, MD        PAT ROS     DASI Risk Score    No data to display       Caprini DVT Assessment    No data to display       Modified Frailty Index    No data to display       CHADS2 Stroke Risk  Current as of 24 minutes ago        N/A 3 to 100%: High Risk   2 to < 3%: Medium Risk   0 to < 2%: Low Risk     Last Change: N/A          This score determines the patient's risk of having a stroke if the patient has atrial fibrillation.        This score is not applicable to this patient. Components are not calculated.          Revised Cardiac Risk Index    No data to display       Apfel Simplified Score    No data to display       Risk Analysis Index Results This Encounter    No data found in the last 1 encounters.         Nurse Plan of Action: After Visit Summary (AVS) reviewed and patient verbalized good understanding of medications and NPO instructions.  Pre-op infection prevention measures:  CHG showers and mouthwash reviewed, understanding voiced.  CHG soap  given and patient verbalized need to pick CHG mouthwash at their preferred local pharmacy.

## 2024-07-11 ENCOUNTER — APPOINTMENT (OUTPATIENT)
Dept: ORTHOPEDIC SURGERY | Facility: CLINIC | Age: 54
End: 2024-07-11
Payer: COMMERCIAL

## 2024-07-12 ENCOUNTER — ANESTHESIA (OUTPATIENT)
Dept: OPERATING ROOM | Facility: HOSPITAL | Age: 54
End: 2024-07-12
Payer: COMMERCIAL

## 2024-07-12 ENCOUNTER — ANESTHESIA EVENT (OUTPATIENT)
Dept: OPERATING ROOM | Facility: HOSPITAL | Age: 54
End: 2024-07-12
Payer: COMMERCIAL

## 2024-07-12 ENCOUNTER — HOSPITAL ENCOUNTER (OUTPATIENT)
Facility: HOSPITAL | Age: 54
Setting detail: OUTPATIENT SURGERY
Discharge: HOME | End: 2024-07-12
Attending: ORTHOPAEDIC SURGERY | Admitting: ORTHOPAEDIC SURGERY
Payer: COMMERCIAL

## 2024-07-12 ENCOUNTER — APPOINTMENT (OUTPATIENT)
Dept: RADIOLOGY | Facility: HOSPITAL | Age: 54
End: 2024-07-12
Payer: COMMERCIAL

## 2024-07-12 VITALS
OXYGEN SATURATION: 94 % | SYSTOLIC BLOOD PRESSURE: 106 MMHG | TEMPERATURE: 97.7 F | RESPIRATION RATE: 17 BRPM | DIASTOLIC BLOOD PRESSURE: 72 MMHG | HEART RATE: 76 BPM

## 2024-07-12 DIAGNOSIS — M20.11 HALLUX VALGUS (ACQUIRED), RIGHT FOOT: ICD-10-CM

## 2024-07-12 DIAGNOSIS — M20.41 HAMMERTOE OF RIGHT FOOT: Primary | ICD-10-CM

## 2024-07-12 DIAGNOSIS — M20.5X1 CLAWTOE, ACQUIRED, RIGHT: ICD-10-CM

## 2024-07-12 LAB — STAPHYLOCOCCUS SPEC CULT: NORMAL

## 2024-07-12 PROCEDURE — 2780000003 HC OR 278 NO HCPCS: Performed by: ORTHOPAEDIC SURGERY

## 2024-07-12 PROCEDURE — C1769 GUIDE WIRE: HCPCS | Performed by: ORTHOPAEDIC SURGERY

## 2024-07-12 PROCEDURE — 7100000002 HC RECOVERY ROOM TIME - EACH INCREMENTAL 1 MINUTE: Performed by: ORTHOPAEDIC SURGERY

## 2024-07-12 PROCEDURE — 2500000004 HC RX 250 GENERAL PHARMACY W/ HCPCS (ALT 636 FOR OP/ED): Performed by: STUDENT IN AN ORGANIZED HEALTH CARE EDUCATION/TRAINING PROGRAM

## 2024-07-12 PROCEDURE — 2500000005 HC RX 250 GENERAL PHARMACY W/O HCPCS: Performed by: ORTHOPAEDIC SURGERY

## 2024-07-12 PROCEDURE — 76000 FLUOROSCOPY <1 HR PHYS/QHP: CPT | Mod: RT

## 2024-07-12 PROCEDURE — 2720000007 HC OR 272 NO HCPCS: Performed by: ORTHOPAEDIC SURGERY

## 2024-07-12 PROCEDURE — C1713 ANCHOR/SCREW BN/BN,TIS/BN: HCPCS | Performed by: ORTHOPAEDIC SURGERY

## 2024-07-12 PROCEDURE — 28285 REPAIR OF HAMMERTOE: CPT | Performed by: ORTHOPAEDIC SURGERY

## 2024-07-12 PROCEDURE — 7100000009 HC PHASE TWO TIME - INITIAL BASE CHARGE: Performed by: ORTHOPAEDIC SURGERY

## 2024-07-12 PROCEDURE — 3600000003 HC OR TIME - INITIAL BASE CHARGE - PROCEDURE LEVEL THREE: Performed by: ORTHOPAEDIC SURGERY

## 2024-07-12 PROCEDURE — 7100000010 HC PHASE TWO TIME - EACH INCREMENTAL 1 MINUTE: Performed by: ORTHOPAEDIC SURGERY

## 2024-07-12 PROCEDURE — 28750 FUSION OF BIG TOE JOINT: CPT | Performed by: ORTHOPAEDIC SURGERY

## 2024-07-12 PROCEDURE — 3600000008 HC OR TIME - EACH INCREMENTAL 1 MINUTE - PROCEDURE LEVEL THREE: Performed by: ORTHOPAEDIC SURGERY

## 2024-07-12 PROCEDURE — 2500000004 HC RX 250 GENERAL PHARMACY W/ HCPCS (ALT 636 FOR OP/ED): Performed by: ANESTHESIOLOGIST ASSISTANT

## 2024-07-12 PROCEDURE — 3700000002 HC GENERAL ANESTHESIA TIME - EACH INCREMENTAL 1 MINUTE: Performed by: ORTHOPAEDIC SURGERY

## 2024-07-12 PROCEDURE — 7100000001 HC RECOVERY ROOM TIME - INITIAL BASE CHARGE: Performed by: ORTHOPAEDIC SURGERY

## 2024-07-12 PROCEDURE — 2500000005 HC RX 250 GENERAL PHARMACY W/O HCPCS: Performed by: STUDENT IN AN ORGANIZED HEALTH CARE EDUCATION/TRAINING PROGRAM

## 2024-07-12 PROCEDURE — 2500000005 HC RX 250 GENERAL PHARMACY W/O HCPCS: Performed by: ANESTHESIOLOGIST ASSISTANT

## 2024-07-12 PROCEDURE — 3700000001 HC GENERAL ANESTHESIA TIME - INITIAL BASE CHARGE: Performed by: ORTHOPAEDIC SURGERY

## 2024-07-12 DEVICE — SCREW, LOW PROFILE,VA LOCKING, 3.0 X 16MM, TI: Type: IMPLANTABLE DEVICE | Site: FIRST TOE | Status: FUNCTIONAL

## 2024-07-12 DEVICE — IMPLANTABLE DEVICE: Type: IMPLANTABLE DEVICE | Site: FIRST TOE | Status: FUNCTIONAL

## 2024-07-12 DEVICE — SCREW, LOW PROFILE,VA LOCKING, 3.0 X 14MM, TI: Type: IMPLANTABLE DEVICE | Site: FIRST TOE | Status: FUNCTIONAL

## 2024-07-12 DEVICE — SCREW, LOW PROFILE, CORTICAL, 3.0 X 18MM: Type: IMPLANTABLE DEVICE | Site: FIRST TOE | Status: FUNCTIONAL

## 2024-07-12 RX ORDER — PROPOFOL 10 MG/ML
INJECTION, EMULSION INTRAVENOUS AS NEEDED
Status: DISCONTINUED | OUTPATIENT
Start: 2024-07-12 | End: 2024-07-12

## 2024-07-12 RX ORDER — TRAMADOL HYDROCHLORIDE 50 MG/1
50 TABLET ORAL EVERY 6 HOURS PRN
Qty: 28 TABLET | Refills: 0 | Status: SHIPPED | OUTPATIENT
Start: 2024-07-12 | End: 2024-07-26

## 2024-07-12 RX ORDER — DIPHENHYDRAMINE HYDROCHLORIDE 50 MG/ML
12.5 INJECTION INTRAMUSCULAR; INTRAVENOUS ONCE AS NEEDED
Status: DISCONTINUED | OUTPATIENT
Start: 2024-07-12 | End: 2024-07-12 | Stop reason: HOSPADM

## 2024-07-12 RX ORDER — BUPIVACAINE HCL/EPINEPHRINE 0.5-1:200K
VIAL (ML) INJECTION AS NEEDED
Status: DISCONTINUED | OUTPATIENT
Start: 2024-07-12 | End: 2024-07-12

## 2024-07-12 RX ORDER — ONDANSETRON HYDROCHLORIDE 2 MG/ML
INJECTION, SOLUTION INTRAVENOUS AS NEEDED
Status: DISCONTINUED | OUTPATIENT
Start: 2024-07-12 | End: 2024-07-12

## 2024-07-12 RX ORDER — LIDOCAINE HYDROCHLORIDE 20 MG/ML
INJECTION, SOLUTION INFILTRATION; PERINEURAL AS NEEDED
Status: DISCONTINUED | OUTPATIENT
Start: 2024-07-12 | End: 2024-07-12

## 2024-07-12 RX ORDER — FENTANYL CITRATE 50 UG/ML
INJECTION, SOLUTION INTRAMUSCULAR; INTRAVENOUS AS NEEDED
Status: DISCONTINUED | OUTPATIENT
Start: 2024-07-12 | End: 2024-07-12

## 2024-07-12 RX ORDER — ROCURONIUM BROMIDE 10 MG/ML
INJECTION, SOLUTION INTRAVENOUS AS NEEDED
Status: DISCONTINUED | OUTPATIENT
Start: 2024-07-12 | End: 2024-07-12

## 2024-07-12 RX ORDER — NAPROXEN SODIUM 220 MG/1
81 TABLET, FILM COATED ORAL 2 TIMES DAILY
Qty: 28 TABLET | Refills: 0 | Status: SHIPPED | OUTPATIENT
Start: 2024-07-12 | End: 2024-07-26

## 2024-07-12 RX ORDER — SODIUM CHLORIDE, SODIUM LACTATE, POTASSIUM CHLORIDE, CALCIUM CHLORIDE 600; 310; 30; 20 MG/100ML; MG/100ML; MG/100ML; MG/100ML
100 INJECTION, SOLUTION INTRAVENOUS CONTINUOUS
Status: DISCONTINUED | OUTPATIENT
Start: 2024-07-12 | End: 2024-07-12 | Stop reason: HOSPADM

## 2024-07-12 RX ORDER — ONDANSETRON HYDROCHLORIDE 2 MG/ML
4 INJECTION, SOLUTION INTRAVENOUS ONCE AS NEEDED
Status: DISCONTINUED | OUTPATIENT
Start: 2024-07-12 | End: 2024-07-12 | Stop reason: HOSPADM

## 2024-07-12 RX ORDER — SODIUM CHLORIDE 0.9 G/100ML
IRRIGANT IRRIGATION AS NEEDED
Status: DISCONTINUED | OUTPATIENT
Start: 2024-07-12 | End: 2024-07-12 | Stop reason: HOSPADM

## 2024-07-12 RX ORDER — MIDAZOLAM HYDROCHLORIDE 1 MG/ML
INJECTION INTRAMUSCULAR; INTRAVENOUS AS NEEDED
Status: DISCONTINUED | OUTPATIENT
Start: 2024-07-12 | End: 2024-07-12

## 2024-07-12 RX ORDER — DOCUSATE SODIUM 100 MG/1
100 CAPSULE, LIQUID FILLED ORAL 2 TIMES DAILY
Qty: 28 CAPSULE | Refills: 0 | Status: SHIPPED | OUTPATIENT
Start: 2024-07-12 | End: 2024-07-26

## 2024-07-12 RX ORDER — LABETALOL HYDROCHLORIDE 5 MG/ML
5 INJECTION, SOLUTION INTRAVENOUS ONCE AS NEEDED
Status: DISCONTINUED | OUTPATIENT
Start: 2024-07-12 | End: 2024-07-12 | Stop reason: HOSPADM

## 2024-07-12 RX ORDER — CEFAZOLIN 1 G/1
INJECTION, POWDER, FOR SOLUTION INTRAVENOUS AS NEEDED
Status: DISCONTINUED | OUTPATIENT
Start: 2024-07-12 | End: 2024-07-12

## 2024-07-12 RX ORDER — CEFAZOLIN SODIUM 2 G/100ML
2 INJECTION, SOLUTION INTRAVENOUS ONCE
Status: DISCONTINUED | OUTPATIENT
Start: 2024-07-12 | End: 2024-07-12 | Stop reason: HOSPADM

## 2024-07-12 RX ORDER — OXYCODONE HYDROCHLORIDE 5 MG/1
5 TABLET ORAL EVERY 6 HOURS PRN
Qty: 28 TABLET | Refills: 0 | Status: SHIPPED | OUTPATIENT
Start: 2024-07-12 | End: 2024-07-19

## 2024-07-12 RX ORDER — OXYCODONE HYDROCHLORIDE 5 MG/1
5 TABLET ORAL EVERY 4 HOURS PRN
Status: DISCONTINUED | OUTPATIENT
Start: 2024-07-12 | End: 2024-07-12 | Stop reason: HOSPADM

## 2024-07-12 RX ORDER — GLYCOPYRROLATE 0.2 MG/ML
INJECTION INTRAMUSCULAR; INTRAVENOUS AS NEEDED
Status: DISCONTINUED | OUTPATIENT
Start: 2024-07-12 | End: 2024-07-12

## 2024-07-12 RX ORDER — LIDOCAINE HYDROCHLORIDE 10 MG/ML
0.1 INJECTION, SOLUTION EPIDURAL; INFILTRATION; INTRACAUDAL; PERINEURAL ONCE
Status: DISCONTINUED | OUTPATIENT
Start: 2024-07-12 | End: 2024-07-12 | Stop reason: HOSPADM

## 2024-07-12 ASSESSMENT — COLUMBIA-SUICIDE SEVERITY RATING SCALE - C-SSRS
1. IN THE PAST MONTH, HAVE YOU WISHED YOU WERE DEAD OR WISHED YOU COULD GO TO SLEEP AND NOT WAKE UP?: NO
2. HAVE YOU ACTUALLY HAD ANY THOUGHTS OF KILLING YOURSELF?: NO
6. HAVE YOU EVER DONE ANYTHING, STARTED TO DO ANYTHING, OR PREPARED TO DO ANYTHING TO END YOUR LIFE?: NO

## 2024-07-12 ASSESSMENT — PAIN SCALES - GENERAL
PAINLEVEL_OUTOF10: 0 - NO PAIN

## 2024-07-12 ASSESSMENT — PAIN - FUNCTIONAL ASSESSMENT
PAIN_FUNCTIONAL_ASSESSMENT: 0-10

## 2024-07-12 NOTE — ANESTHESIA PREPROCEDURE EVALUATION
Patient: Gayla Jarvis    Procedure Information       Date/Time: 07/12/24 6003    Procedures:       Right Foot Arthrodesis (Right: Foot)      Hammertoe Correction (Right: Foot)    Location: [facility] OR  / [facility] OR    Surgeons: Jaxson Barrow MD            Relevant Problems   No relevant active problems       Clinical information reviewed:                   NPO Detail:  No data recorded     Physical Exam    Airway  Mallampati: II  TM distance: >3 FB  Neck ROM: full     Cardiovascular   Rhythm: regular  Rate: normal     Dental    Pulmonary   Breath sounds clear to auscultation     Abdominal            Anesthesia Plan    History of general anesthesia?: yes  History of complications of general anesthesia?: no    ASA 2     general     intravenous induction   Anesthetic plan and risks discussed with patient.    Plan discussed with CRNA.

## 2024-07-12 NOTE — ANESTHESIA PROCEDURE NOTES
Airway  Date/Time: 7/12/2024 8:28 AM  Urgency: elective    Airway not difficult    Staffing  Performed: CAA   Authorized by: Jaun Gonzales MD    Performed by: FENG Pat  Patient location during procedure: OR    Indications and Patient Condition  Indications for airway management: anesthesia  Spontaneous Ventilation: absent  Sedation level: deep  Preoxygenated: yes  Patient position: sniffing  Mask difficulty assessment: 0 - not attempted    Final Airway Details  Final airway type: supraglottic airway      Successful airway: Size 3      Additional Comments  Easy iGel by FENG

## 2024-07-12 NOTE — OP NOTE
Right Foot Arthrodesis (R), Hammertoe Correction, 2nd, 3rd, 4th toe correction (R) Operative Note     Date: 2024  OR Location: [facility] A OR    Name: Gayla Jarvis, : 1970, Age: 54 y.o., MRN: [id number], Sex: female    Diagnosis  Pre-op Diagnosis      * Hallux valgus (acquired), right foot [M20.11]     * Hammertoe of right foot [M20.41] Post-op Diagnosis     * Hallux valgus (acquired), right foot [M20.11]     * Hammertoe of right foot [M20.41]     Procedures  Right Foot Arthrodesis  93641 - PA ARTHRODESIS GREAT TOE METATARSOPHALANGEAL JOINT    Hammertoe Correction, 2nd, 3rd, 4th toe correction  50975 - PA CORRECTION HAMMERTOE      Surgeons      * Jaxson Barrow - Primary    Resident/Fellow/Other Assistant:  Surgeons and Role:     * Ej Toth MD - Resident - Assisting    Procedure Summary  Anesthesia: Anesthesia type not filed in the log.  ASA: II  Anesthesia Staff: Anesthesiologist: Jaun Gonzales MD  C-AA: FENG Pat  Estimated Blood Loss: 0mL  Intra-op Medications:   Administrations occurring from 0830 to 1030 on 24:   Medication Name Total Dose   sodium chloride 0.9 % irrigation solution 1,000 mL              Anesthesia Record               Intraprocedure I/O Totals       None           Specimen: No specimens collected     Staff:   Circulator: Sarah Mejia Person: Holly Angeles Circulator: Kamla         Drains and/or Catheters: * None in log *    Tourniquet Times:   * Missing tourniquet times found for documented tourniquets in lo *     Implants:  Implants       Type Name Action Serial No.      Screw PLATE, MTP COMP, PETITE, 0 DEG VALGUS, 0 DEG DORSIFLEX, LT - SNA - [id number] Implanted NA     Screw SCREW, LOW PROFILE,VA LOCKING, 3.0 X 12MM, TI - SNA - [id number] Implanted NA     Screw SCREW, LOW PROFILE,VA LOCKING, 3.0 X 14MM, TI - SNA - [id number] Implanted NA     Screw SCREW, LOW PROFILE,VA LOCKING, 3.0 X 16MM, TI - SNA - [id number] Implanted NA     Screw  SCREW, LOW PROFILE, CORTICAL, 3.0 X 18MM - [id number] Implanted      Screw SCREW, QUICKFIX, COLTON, PARTIAL THD, 3.0 X 24MM - [id number] Implanted               Findings: Severe hallux valgus with eburnation of the metatarsal head.  Second hammertoe, third mallet toe, fourth curly toe.    Indications: Gayla Jarvis is an 54 y.o. female who is having surgery for Hallux valgus (acquired), right foot [M20.11]  Hammertoe of right foot [M20.41].  Chronic severe hallux valgus with lesser toe deformity.  Here for surgical correction.    The patient was seen in the preoperative area. The risks, benefits, complications, treatment options, non-operative alternatives, expected recovery and outcomes were discussed with the patient. The possibilities of reaction to medication, pulmonary aspiration, injury to surrounding structures, bleeding, recurrent infection, the need for additional procedures, failure to diagnose a condition, and creating a complication requiring transfusion or operation were discussed with the patient. The patient concurred with the proposed plan, giving informed consent.  The site of surgery was properly noted/marked if necessary per policy. The patient has been actively warmed in preoperative area. Preoperative antibiotics have been ordered and given within 1 hours of incision. Venous thrombosis prophylaxis are not indicated.    Procedure Details:   Preop DX: Right first MTP arthritis/degenerative hallux valgus; 2nd hammer, 3rd mallet, 4th curly toe  Postop DX: : Same  Procedure:  Right first MTP fusion; 2nd PIP/3rd DIP/4th DIP fusion  Surgeon: Jaxson Barrow MD  Asst: Ej Toth MD  Anesthesia:  General and regional  Clinical Note:  54 year-old female with end-stage arthrosis/degenerative hallux valgus  Right first MTP joint and lesser toe deformities..  Here for surgical fusion.  Understood risk of surgery including bleeding, infection, nonunion, malunion, DVT, possible need for further surgery or  shoewear modification.  Understood these risks and wished to proceed.  Procedure Note: Patient brought to operating room after regional anesthesia.  Timeout performed. Antibiotics given IV.  General anesthesia given.  Right leg prepped and draped in typical sterile fashion with tourniquet on the calf.  Leg was exsanguinated and Tourniquet inflated to 275 mmHg.  Longitudinal incision made over the dorsal first MTP joint.  Blunt dissection down to capsule.  Capsule incised longitudinally.  Grade 4 changes were noted.  Large spurs were noted.    Using the Arthrex reaming system joint was reamed down to good bleeding bone on the proximal phalanx and first metatarsal.  Joint was held in the fusion position held with a temporary guidepin.  Good alignment by C-arm and on flat plate.  Dorsal plate was then fixed to the bone with 3 locking screws in the proximal phalanx.  Compression through the metatarsal and compression screw through the proximal end of the plate.  2 locking screws in the metatarsal.  Maintaining excellent alignment and good compression.  C-arm image showed good alignment.  Guidepin was then replaced with a cannulated compression screw for additional compression.  Good alignment by C-arm and.  Wounds irrigated.  Capsule repaired interrupted suture.  Wound was closed in layers.   Attention turned to lesser toes.  Elliptical incision made over the dorsal aspect of the second PIP and third and fourth DIP joints.  Skin and extensor hoods were excised.  Bone was exposed.  Condyles of the proximal phalanx of each joint were resected with a bone cutter.  The base of the distal articulation was rongeured.  Long flexors from the second PIP joint were released.  The lesser toes were then fixed in neutral position with 0.62 K wires.  Good alignment by C-arm imaging.  Incisions were closed with nylon mattress suture.   Dressed with a soft bulky dressing.  Taken recovery room in stable condition.  Complications:  None;  patient tolerated the procedure well.    Disposition: PACU - hemodynamically stable.  Condition: stable         Additional Details: none    Attending Attestation: I was present and scrubbed for the entire procedure.    Jaxson Barrow  Phone Number: [phone]

## 2024-07-12 NOTE — ADDENDUM NOTE
Addendum  created 07/12/24 1112 by Jaun Gonzales MD    Child order released for a procedure order, Clinical Note Signed, Intraprocedure Blocks edited, Intraprocedure Meds edited, SmartForm saved

## 2024-07-12 NOTE — ANESTHESIA POSTPROCEDURE EVALUATION
Patient: Gayla Jarvis    Procedure Summary       Date: 07/12/24 Room / Location: [facility] A OR 12 / [facility] OR    Anesthesia Start: 0812 Anesthesia Stop: 1004    Procedures:       Right Foot Arthrodesis (Right: Foot)      Hammertoe Correction, 2nd, 3rd, 4th toe correction (Right: Foot) Diagnosis:       Hallux valgus (acquired), right foot      Hammertoe of right foot      (Hallux valgus (acquired), right foot [M20.11])      (Hammertoe of right foot [M20.41])    Surgeons: Jaxson Barrow MD Responsible Provider: Jaun Gonzales MD    Anesthesia Type: general ASA Status: 2            Anesthesia Type: general    Vitals Value Taken Time   /54 07/12/24 1018   Temp 37 07/12/24 1019   Pulse 86 07/12/24 1018   Resp 12 07/12/24 1018   SpO2 94 % 07/12/24 1018   Vitals shown include unfiled device data.    Anesthesia Post Evaluation    Patient location during evaluation: bedside  Patient participation: complete - patient participated  Level of consciousness: awake  Pain management: adequate  Multimodal analgesia pain management approach  Airway patency: patent  Cardiovascular status: stable  Respiratory status: spontaneous ventilation and unassisted  Hydration status: acceptable  Postoperative Nausea and Vomiting: none  Comments: No significant PONV.        No notable events documented.

## 2024-07-12 NOTE — DISCHARGE INSTRUCTIONS
Additional instructions  Ice/Elevate operative extremity.  Keep dressing clean and dry.    Keep dressing in place.  Weightbearing: NWB on operative extremity with crutches/walker until block wears off.  Then may foot flat weightbearing in surgical shoe.  Start Tylenol 650 mg by mouth every 6 hours today as needed for pain.  Oxycodone 1 by mouth every 6 hours as needed for pain when block wears off.  Tramadol 1 tablet every 6 hours for breakthrough pain between Oxy/Tylenol.  Aspirin 81 mg by mouth twice daily.  Colace twice a day for constipation.  F/U with Dr. Barrow in 2 weeks.  Call [phone] for appointment time.

## 2024-07-12 NOTE — ANESTHESIA PROCEDURE NOTES
Peripheral Block    Patient location during procedure: pre-op  Start time: 7/12/2024 8:05 AM  End time: 7/12/2024 8:10 AM  Reason for block: at surgeon's request and post-op pain management  Staffing  Performed: attending   Authorized by: Jaun Gonzales MD    Performed by: Jaun Gonzales MD  Preanesthetic Checklist  Completed: patient identified, IV checked, site marked, risks and benefits discussed, surgical consent, monitors and equipment checked, pre-op evaluation and timeout performed   Timeout performed at:   Peripheral Block  Patient position: laying flat  Prep: ChloraPrep and site prepped and draped  Patient monitoring: continuous pulse ox, heart rate and cardiac monitor  Block type: popliteal  Laterality: right  Injection technique: single-shot  Guidance: ultrasound guided  Local infiltration: lidocaine  Infiltration strength: 1 %  Dose: 3 mL  Needle  Needle type: short-bevel   Needle gauge: 22 G  Needle length: 8 cm  Needle localization: ultrasound guidance  Test dose: negative  Assessment  Injection assessment: negative aspiration for heme, local visualized surrounding nerve on ultrasound, no paresthesia on injection and incremental injection  Heart rate change: no  Slow fractionated injection: yes

## 2024-07-25 ENCOUNTER — APPOINTMENT (OUTPATIENT)
Dept: ORTHOPEDIC SURGERY | Facility: CLINIC | Age: 54
End: 2024-07-25
Payer: COMMERCIAL

## 2024-07-25 ENCOUNTER — HOSPITAL ENCOUNTER (OUTPATIENT)
Dept: RADIOLOGY | Facility: CLINIC | Age: 54
Discharge: HOME | End: 2024-07-25
Payer: COMMERCIAL

## 2024-07-25 VITALS — WEIGHT: 152 LBS | HEIGHT: 62 IN | BODY MASS INDEX: 27.97 KG/M2

## 2024-07-25 DIAGNOSIS — M20.41 HAMMERTOE OF RIGHT FOOT: ICD-10-CM

## 2024-07-25 DIAGNOSIS — M20.11 HALLUX VALGUS (ACQUIRED), RIGHT FOOT: Primary | ICD-10-CM

## 2024-07-25 PROCEDURE — 99024 POSTOP FOLLOW-UP VISIT: CPT | Performed by: ORTHOPAEDIC SURGERY

## 2024-07-25 PROCEDURE — 3008F BODY MASS INDEX DOCD: CPT | Performed by: ORTHOPAEDIC SURGERY

## 2024-07-25 PROCEDURE — 73630 X-RAY EXAM OF FOOT: CPT | Mod: RT

## 2024-07-25 PROCEDURE — 73630 X-RAY EXAM OF FOOT: CPT | Mod: RIGHT SIDE | Performed by: RADIOLOGY

## 2024-07-25 PROCEDURE — 1036F TOBACCO NON-USER: CPT | Performed by: ORTHOPAEDIC SURGERY

## 2024-07-25 ASSESSMENT — PAIN - FUNCTIONAL ASSESSMENT: PAIN_FUNCTIONAL_ASSESSMENT: 0-10

## 2024-07-25 ASSESSMENT — PAIN SCALES - GENERAL: PAINLEVEL_OUTOF10: 2

## 2024-07-25 ASSESSMENT — PAIN DESCRIPTION - DESCRIPTORS: DESCRIPTORS: OTHER (COMMENT)

## 2024-07-25 NOTE — PROGRESS NOTES
S: Pain well controlled.     O: Incisions healing nicely. Good alignment.    XR: I personally reviewed the following radiographic exams: Right foot shows well aligned great toe with stable plate.  Well aligned lesser toes with K wires.    A: S/P right forefoot reconstruction.    P: Sutures out.  Foot flat weightbearing in surgical shoe.  Follow-up x-ray in 2 weeks.  Plan pin removal lesser toes.

## 2024-08-08 ENCOUNTER — HOSPITAL ENCOUNTER (OUTPATIENT)
Dept: RADIOLOGY | Facility: CLINIC | Age: 54
Discharge: HOME | End: 2024-08-08
Payer: COMMERCIAL

## 2024-08-08 ENCOUNTER — APPOINTMENT (OUTPATIENT)
Dept: ORTHOPEDIC SURGERY | Facility: CLINIC | Age: 54
End: 2024-08-08
Payer: COMMERCIAL

## 2024-08-08 DIAGNOSIS — M20.5X1 CLAWTOE, ACQUIRED, RIGHT: ICD-10-CM

## 2024-08-08 DIAGNOSIS — M20.11 HALLUX VALGUS (ACQUIRED), RIGHT FOOT: ICD-10-CM

## 2024-08-08 DIAGNOSIS — M20.11 HALLUX VALGUS (ACQUIRED), RIGHT FOOT: Primary | ICD-10-CM

## 2024-08-08 DIAGNOSIS — M20.41 HAMMERTOE OF RIGHT FOOT: ICD-10-CM

## 2024-08-08 PROCEDURE — 1036F TOBACCO NON-USER: CPT | Performed by: ORTHOPAEDIC SURGERY

## 2024-08-08 PROCEDURE — 73630 X-RAY EXAM OF FOOT: CPT | Mod: RT

## 2024-08-08 PROCEDURE — 99024 POSTOP FOLLOW-UP VISIT: CPT | Performed by: ORTHOPAEDIC SURGERY

## 2024-08-08 NOTE — PROGRESS NOTES
S: Pain well controlled.     O: Incisions healing nicely. Good alignment.    XR: I personally reviewed the following radiographic exams: Right foot shows well aligned first MTP fusion.  Well aligned lesser toes with K wire.    A: S/P right forefoot reconstruction.    P: Pins removed from the toes.  Continue surgical shoe for 2 weeks.  Follow-up x-ray right foot in 2 weeks.

## 2024-08-22 ENCOUNTER — HOSPITAL ENCOUNTER (OUTPATIENT)
Dept: RADIOLOGY | Facility: CLINIC | Age: 54
Discharge: HOME | End: 2024-08-22
Payer: COMMERCIAL

## 2024-08-22 ENCOUNTER — APPOINTMENT (OUTPATIENT)
Dept: ORTHOPEDIC SURGERY | Facility: CLINIC | Age: 54
End: 2024-08-22
Payer: COMMERCIAL

## 2024-08-22 DIAGNOSIS — M20.11 HALLUX VALGUS (ACQUIRED), RIGHT FOOT: ICD-10-CM

## 2024-08-22 DIAGNOSIS — M20.41 HAMMERTOE OF RIGHT FOOT: Primary | ICD-10-CM

## 2024-08-22 DIAGNOSIS — M20.5X1 CLAWTOE, ACQUIRED, RIGHT: ICD-10-CM

## 2024-08-22 PROCEDURE — 73630 X-RAY EXAM OF FOOT: CPT | Mod: RT

## 2024-08-22 PROCEDURE — 1036F TOBACCO NON-USER: CPT | Performed by: ORTHOPAEDIC SURGERY

## 2024-08-22 PROCEDURE — 99024 POSTOP FOLLOW-UP VISIT: CPT | Performed by: ORTHOPAEDIC SURGERY

## 2024-08-22 NOTE — PROGRESS NOTES
Returns for right foot.  Still in surgical shoe.  Has some nerve pain in the fourth toe.  Still swells during the day.    Exam: Well aligned lesser toes.  Well aligned great toe.  No pain with first MTP stress.  Sensitive scars over dorsal toes.    I personally reviewed the following radiographic exams: Right foot shows healing first MTP fusion with dorsal plate.  Well aligned lesser toe PIP/DIP joints.    Assessment: Status post right forefoot reconstruction.    Plan: Reassured patient.  Work on desensitization.  Progress activity and shoewear over the next month.  Follow-up x-ray right foot 5 weeks.

## 2024-09-27 ENCOUNTER — APPOINTMENT (OUTPATIENT)
Dept: ORTHOPEDIC SURGERY | Facility: CLINIC | Age: 54
End: 2024-09-27
Payer: COMMERCIAL

## 2024-10-24 ENCOUNTER — APPOINTMENT (OUTPATIENT)
Dept: ORTHOPEDIC SURGERY | Facility: CLINIC | Age: 54
End: 2024-10-24
Payer: COMMERCIAL

## 2024-10-24 ENCOUNTER — APPOINTMENT (OUTPATIENT)
Dept: RADIOLOGY | Facility: CLINIC | Age: 54
End: 2024-10-24
Payer: COMMERCIAL

## 2024-11-14 ENCOUNTER — APPOINTMENT (OUTPATIENT)
Dept: ORTHOPEDIC SURGERY | Facility: CLINIC | Age: 54
End: 2024-11-14
Payer: COMMERCIAL

## 2024-12-18 ENCOUNTER — APPOINTMENT (OUTPATIENT)
Dept: ORTHOPEDIC SURGERY | Facility: CLINIC | Age: 54
End: 2024-12-18
Payer: COMMERCIAL

## 2024-12-18 ENCOUNTER — APPOINTMENT (OUTPATIENT)
Dept: RADIOLOGY | Facility: CLINIC | Age: 54
End: 2024-12-18
Payer: COMMERCIAL

## 2024-12-26 ENCOUNTER — HOSPITAL ENCOUNTER (OUTPATIENT)
Dept: RADIOLOGY | Facility: CLINIC | Age: 54
Discharge: HOME | End: 2024-12-26
Payer: COMMERCIAL

## 2024-12-26 ENCOUNTER — APPOINTMENT (OUTPATIENT)
Dept: ORTHOPEDIC SURGERY | Facility: CLINIC | Age: 54
End: 2024-12-26
Payer: COMMERCIAL

## 2024-12-26 DIAGNOSIS — M20.41 HAMMERTOE OF RIGHT FOOT: ICD-10-CM

## 2024-12-26 DIAGNOSIS — M20.11 HALLUX VALGUS (ACQUIRED), RIGHT FOOT: ICD-10-CM

## 2024-12-26 DIAGNOSIS — M20.11 HALLUX VALGUS (ACQUIRED), RIGHT FOOT: Primary | ICD-10-CM

## 2024-12-26 DIAGNOSIS — M20.5X1 CLAWTOE, ACQUIRED, RIGHT: ICD-10-CM

## 2024-12-26 PROCEDURE — 73630 X-RAY EXAM OF FOOT: CPT | Mod: RT

## 2024-12-26 PROCEDURE — 99213 OFFICE O/P EST LOW 20 MIN: CPT | Performed by: ORTHOPAEDIC SURGERY

## 2024-12-26 PROCEDURE — 1036F TOBACCO NON-USER: CPT | Performed by: ORTHOPAEDIC SURGERY

## 2024-12-26 NOTE — PROGRESS NOTES
Returns for right foot.  Very happy with progress.  Occasional pain around her old neuroma.  Great toe feels fine.    Exam: No swelling.  No pain with first MTP stress.    I personally reviewed the following radiographic exams: Right foot shows healed first MTP fusion.  Stable hardware.    Assessment: Status post right forefoot reconstruction.    Plan: Discussed nonoperative and operative options in detail.   Risk and benefits discussed in detail. All questions answered today.  Recovery timeline and expectations discussed in detail.  No restrictions.  May follow-up to discuss left foot with x-rays in the future.

## (undated) DEVICE — COVER, MAYO STAND, W/PAD, 23 IN, DISPOSABLE, PLASTIC, LF, STERILE

## (undated) DEVICE — REAMER, CIRCULAR MTP, 18MM

## (undated) DEVICE — NEEDLE, HYPODERMIC, REGULAR WALL, REGULAR BEVEL, 22 G X 1 IN

## (undated) DEVICE — BANDAGE, ELASTIC, PREMIUM, SELF-CLOSE, 6 IN X 5.5 YD, STERILE

## (undated) DEVICE — SUTURE, MONOCRYL, 3-0, 18 IN, PS2, UNDYED

## (undated) DEVICE — CUFF, TOURNIQUET, 18 X 4, DUAL PORT/SNGL BLADDER, DISP, LF

## (undated) DEVICE — GUIDEWIRE, W/TROCAR TIP, .062

## (undated) DEVICE — SOLUTION, IRRIGATION, SODIUM CHLORIDE 0.9%, 1000 ML, POUR BOTTLE

## (undated) DEVICE — BB-TAK, MTP, THREADED

## (undated) DEVICE — SUTURE, ETHILON, 4-0, BLK, MONO, PS-2 18

## (undated) DEVICE — GUIDEWIRE, .045, W/ TROCAR TIP

## (undated) DEVICE — PADDING, WEBRIL, UNDERCAST, STERILE, 6 IN

## (undated) DEVICE — DRESSING, GAUZE, PETROLATUM, PATCH, XEROFORM, 1 X 8 IN, STERILE

## (undated) DEVICE — Device

## (undated) DEVICE — BANDAGE, COFLEX, 4 X 5 YDS, FOAM TAN, STERILE, LF

## (undated) DEVICE — SUTURE, MONOCRYL, 4-0, 27 IN, PS-2, UNDYED

## (undated) DEVICE — REAMER, METATARSAL, 18MM

## (undated) DEVICE — SUTURE, VICRYL, 0, 27 IN, CT-2, UNDYED

## (undated) DEVICE — GLOVE, SURGICAL, PROTEXIS PI MICRO, 8.0, PF, LF

## (undated) DEVICE — BANDAGE, ELASTIC, ACE, SELF-CLOSURE, 3 IN X 5 YD, NONSTERILE

## (undated) DEVICE — DRILL BIT, CANNULATED, 2.0MM

## (undated) DEVICE — ADHESIVE, DERMABOND, PRINEO, 12 X 9, STERILE

## (undated) DEVICE — REAMER, PHALANGEAL, 16MM

## (undated) DEVICE — DRESSING, GAUZE, FLUFF, 1 PLY, 18 X 36 IN

## (undated) DEVICE — DRAPE, INCISE, STERI DRAPE, 36 X 34 IN, DISPOSABLE, STERILE

## (undated) DEVICE — DRILL BIT, 2.2MM

## (undated) DEVICE — GLOVE, SURGICAL, PROTEXIS PI W/NEU-THERA, 8.0, PF, LF

## (undated) DEVICE — DRESSING, GAUZE, KERLIX, 12 PLY, 4 X 4 IN, STERILE